# Patient Record
Sex: FEMALE | URBAN - METROPOLITAN AREA
[De-identification: names, ages, dates, MRNs, and addresses within clinical notes are randomized per-mention and may not be internally consistent; named-entity substitution may affect disease eponyms.]

---

## 2017-08-08 PROBLEM — D47.2 MONOCLONAL GAMMOPATHY: Status: ACTIVE | Noted: 2017-08-08

## 2017-08-08 PROBLEM — I69.322 CVA, OLD, DYSARTHRIA: Status: ACTIVE | Noted: 2017-08-08

## 2020-07-09 ENCOUNTER — OFFICE VISIT (OUTPATIENT)
Dept: ENDOCRINOLOGY | Age: 55
End: 2020-07-09
Payer: COMMERCIAL

## 2020-07-09 VITALS
SYSTOLIC BLOOD PRESSURE: 132 MMHG | BODY MASS INDEX: 36.7 KG/M2 | TEMPERATURE: 95.6 F | WEIGHT: 199.4 LBS | DIASTOLIC BLOOD PRESSURE: 74 MMHG | HEIGHT: 62 IN | OXYGEN SATURATION: 95 % | HEART RATE: 105 BPM

## 2020-07-09 PROBLEM — E78.2 MIXED HYPERLIPIDEMIA: Status: ACTIVE | Noted: 2020-07-09

## 2020-07-09 PROBLEM — Z86.73 HISTORY OF CVA (CEREBROVASCULAR ACCIDENT): Status: ACTIVE | Noted: 2020-07-09

## 2020-07-09 PROBLEM — E66.9 CLASS 2 OBESITY WITH BODY MASS INDEX (BMI) OF 35.0 TO 35.9 IN ADULT: Status: ACTIVE | Noted: 2020-07-09

## 2020-07-09 PROCEDURE — 2022F DILAT RTA XM EVC RTNOPTHY: CPT | Performed by: INTERNAL MEDICINE

## 2020-07-09 PROCEDURE — 4004F PT TOBACCO SCREEN RCVD TLK: CPT | Performed by: INTERNAL MEDICINE

## 2020-07-09 PROCEDURE — 3046F HEMOGLOBIN A1C LEVEL >9.0%: CPT | Performed by: INTERNAL MEDICINE

## 2020-07-09 PROCEDURE — G8417 CALC BMI ABV UP PARAM F/U: HCPCS | Performed by: INTERNAL MEDICINE

## 2020-07-09 PROCEDURE — G8427 DOCREV CUR MEDS BY ELIG CLIN: HCPCS | Performed by: INTERNAL MEDICINE

## 2020-07-09 PROCEDURE — 99204 OFFICE O/P NEW MOD 45 MIN: CPT | Performed by: INTERNAL MEDICINE

## 2020-07-09 PROCEDURE — 3017F COLORECTAL CA SCREEN DOC REV: CPT | Performed by: INTERNAL MEDICINE

## 2020-07-09 RX ORDER — DEXTROMETHORPHAN HYDROBROMIDE AND QUINIDINE SULFATE 20; 10 MG/1; MG/1
1 CAPSULE, GELATIN COATED ORAL DAILY
COMMUNITY

## 2020-07-09 RX ORDER — METOPROLOL SUCCINATE 25 MG/1
25 TABLET, EXTENDED RELEASE ORAL DAILY
COMMUNITY

## 2020-07-09 RX ORDER — CITALOPRAM 10 MG/1
10 TABLET ORAL DAILY
COMMUNITY

## 2020-07-09 RX ORDER — DIVALPROEX SODIUM 500 MG/1
500 TABLET, EXTENDED RELEASE ORAL DAILY
COMMUNITY

## 2020-07-09 RX ORDER — ASPIRIN 81 MG/1
81 TABLET ORAL DAILY
COMMUNITY

## 2020-07-09 RX ORDER — INSULIN LISPRO 200 [IU]/ML
INJECTION, SOLUTION SUBCUTANEOUS
Qty: 2 PEN | Refills: 3
Start: 2020-07-09

## 2020-07-09 RX ORDER — PRAMIPEXOLE DIHYDROCHLORIDE 0.5 MG/1
0.5 TABLET ORAL 3 TIMES DAILY
COMMUNITY

## 2020-07-09 RX ORDER — GABAPENTIN 100 MG/1
100 CAPSULE ORAL 3 TIMES DAILY
COMMUNITY

## 2020-07-09 RX ORDER — BUSPIRONE HYDROCHLORIDE 5 MG/1
5 TABLET ORAL 3 TIMES DAILY
COMMUNITY

## 2020-07-09 RX ORDER — BLOOD SUGAR DIAGNOSTIC
1 STRIP MISCELLANEOUS DAILY
COMMUNITY

## 2020-07-09 NOTE — PROGRESS NOTES
Angela Griffith is a 62 y.o. female who is being evaluated for Type 2 diabetes mellitus. Current symptoms/problems include hyperglycemia and are worsening. DM type 2, uncontrolled, with neuropathy ([facility] [address] [E11.40, E11.65]    Diagnosed with Type 2 diabetes mellitus in 2010. Comorbid conditions: hyperlipidemia, CVA and Neuropathy    Current diabetic medications include: Levemir 60 units qhs, Humalog 4-10 units qac    Intolerance to diabetes medications: Yes Metformin-vomiting, another medication, does not remember the name- low BG  2017 had CVA, left side affected. Weight trend: stable  Prior visit with dietician: yes  Current diet: on average, 1-2 meals per day  Current exercise: no     Current monitoring regimen: home blood tests - 2-4 times daily  Has brought blood glucose log/meter:  No  Home blood sugar records: fasting range: 150-200 and postprandial range: 140-220   Any episodes of hypoglycemia? Yes  Hypoglycemia frequency and time(s):  rare  Does patient have Glucagon emergency kit? No  Does patient have rapid acting carbohydrate? Yes  Does patient wear a medic alert bracelet or necklace? No    2. Mixed hyperlipidemia  Has muscle  cramping     3. Class 2 severe obesity with serious comorbidity and body mass index (BMI) of 36.0 to 36.9 in adult, unspecified obesity type [facility])  Not always eats healthy    4.  History of CVA (cerebrovascular accident)  Left-sided hemiparesis, difficult to walk    Past Medical History:   Diagnosis Date    Acute DVT (deep venous thrombosis) (HCC)     Anxiety     Cerebral artery occlusion with cerebral infarction (HCC)     Chronic back pain     Depression     Depression with anxiety     Diabetes mellitus ([facility] [address]     Edema     Hyperlipidemia     IBS (irritable bowel syndrome)     PE (pulmonary thromboembolism) ([address]       Patient Active Problem List   Diagnosis    CVA, old, dysarthria    Monoclonal gammopathy    DM type 2, uncontrolled, with neuropathy ([address]  Mixed hyperlipidemia    Class 2 obesity with body mass index (BMI) of 35.0 to 35.9 in adult    History of CVA (cerebrovascular accident)     Past Surgical History:   Procedure Laterality Date    CARDIAC CATHETERIZATION       SECTION      CHOLECYSTECTOMY      CHOLECYSTECTOMY      IVC FILTER INSERTION      TONSILLECTOMY       Social History     Socioeconomic History    Marital status: Unknown     Spouse name: Not on file    Number of children: Not on file    Years of education: Not on file    Highest education level: Not on file   Occupational History    Not on file   Social Needs    Financial resource strain: Not on file    Food insecurity     Worry: Not on file     Inability: Not on file    Transportation needs     Medical: Not on file     Non-medical: Not on file   Tobacco Use    Smoking status: Current Every Day Smoker     Packs/day: 1.00     Years: 20.00     Pack years: 20.00     Types: Cigarettes    Smokeless tobacco: Never Used   Substance and Sexual Activity    Alcohol use: Not Currently    Drug use: Not on file    Sexual activity: Not on file   Lifestyle    Physical activity     Days per week: Not on file     Minutes per session: Not on file    Stress: Not on file   Relationships    Social connections     Talks on phone: Not on file     Gets together: Not on file     Attends Anabaptist service: Not on file     Active member of club or organization: Not on file     Attends meetings of clubs or organizations: Not on file     Relationship status: Not on file    Intimate partner violence     Fear of current or ex partner: Not on file     Emotionally abused: Not on file     Physically abused: Not on file     Forced sexual activity: Not on file   Other Topics Concern    Not on file   Social History Narrative    Not on file     Family History   Problem Relation Age of Onset    Hypertension Mother     Diabetes Mother     Heart Disease Father      Current Outpatient Medications Medication Sig Dispense Refill    insulin lispro (HUMALOG KWIKPEN) 200 UNIT/ML SOPN pen 150-200- 2 units, 201-250 4 units, 251-300 6 units 301-350 8 units, 351-400 10 units 2 pen 3    insulin detemir (LEVEMIR FLEXPEN) 100 UNIT/ML injection pen Inject 60 Units into the skin nightly 5 pen 1    pramipexole (MIRAPEX) 0.5 MG tablet Take 0.5 mg by mouth 3 times daily      metoprolol succinate (TOPROL XL) 25 MG extended release tablet Take 25 mg by mouth daily      divalproex (DEPAKOTE ER) 500 MG extended release tablet Take 500 mg by mouth daily      citalopram (CELEXA) 10 MG tablet Take 10 mg by mouth daily      busPIRone (BUSPAR) 5 MG tablet Take 5 mg by mouth 3 times daily      blood glucose test strips (ONETOUCH ULTRA) strip 1 each by In Vitro route daily As needed.  dextromethorphan-quiNIDine (NUEDEXTA) 20-10 MG CAPS per capsule Take 1 capsule by mouth daily      Magic Mouthwash (MIRACLE MOUTHWASH) Swish and spit 5 mLs 4 times daily as needed for Irritation      gabapentin (NEURONTIN) 100 MG capsule Take 100 mg by mouth 3 times daily.  aspirin 81 MG EC tablet Take 81 mg by mouth daily      atorvastatin (LIPITOR) 80 MG tablet Take 80 mg by mouth daily      rivaroxaban (XARELTO) 20 MG TABS tablet Take 20 mg by mouth      buPROPion (WELLBUTRIN XL) 300 MG extended release tablet Take 300 mg by mouth every morning      rOPINIRole (REQUIP) 0.5 MG tablet Take 0.5 mg by mouth 3 times daily       No current facility-administered medications for this visit.       Allergies   Allergen Reactions    Coumadin [Warfarin Sodium] Hives    Red Dye Rash     Family Status   Relation Name Status    Mother  (Not Specified)    Father  (Not Specified)       Lab Review:    No results found for: WBC, HGB, HCT, MCV, PLT  Lab Results   Component Value Date    PROT 6.8 08/08/2017    PROT 6.8 08/08/2017    LABALBU 4.26 08/08/2017     No results found for: TSHFT4, TSH, FT3  No results found for: LABA1C  No results found for: EAG  No results found for: CHOL  No results found for: TRIG  No results found for: HDL  No results found for: LDLCHOLESTEROL, LDLCALC  No results found for: LABVLDL, VLDL  No results found for: CHOLHDLRATIO  No results found for: Julienne Benes  No results found for: VITD25     Review of Systems:  Constitutional: has fatigue, no fever, no recent weight gain, no recent weight loss, has changes in appetite  Eyes: no eye pain, has change in vision, no eye redness, no eye irritation, no double vision  Ears, nose, throat: no nasal congestion, no sore throat, no earache, no decrease in hearing, no hoarseness, no dry mouth, no sinus problems, no difficulty swallowing, no neck lumps, no dental problems, no mouth sores, no ringing in ears  Pulmonary: no shortness of breath, no wheezing, no dyspnea on exertion, no cough  Cardiovascular: no chest pain, no lower extremity edema, no orthopnea, no intermittent leg claudication, no palpitations  Gastrointestinal: no abdominal pain, no nausea, no vomiting, no diarrhea, no constipation, no dysphagia, no heartburn, no bloating  Genitourinary: no dysuria, no urinary incontinence, no urinary hesitancy, no urinary frequency, no feelings of urinary urgency, has nocturia  Musculoskeletal: no joint swelling, no joint stiffness, has joint pain, has muscle cramps, no muscle pain, no bone pain  Integument/Breast: no hair loss, no skin rashes, no skin lesions, no itching, has dry skin  Neurological: has numbness, has tingling, no weakness, no confusion, no headaches, has dizziness, no fainting, no tremors, no decrease in memory, has balance problems  Psychiatric: has anxiety, has depression, has insomnia  Hematologic/Lymphatic: no tendency for easy bleeding, no swollen lymph nodes, no tendency for easy bruising  Immunology: no seasonal allergies, no frequent infections, no frequent illnesses  Endocrine: no temperature intolerance    /74 (Site: Left Upper Arm, Position: Sitting, Cuff Size: Large Adult)   Pulse 105   Temp 95.6 °F (35.3 °C) (Infrared)   Ht 5' 2\" (1.575 m)   Wt 199 lb 6.4 oz (90.4 kg)   SpO2 95%   BMI 36.47 kg/m²    Wt Readings from Last 3 Encounters:   07/09/20 199 lb 6.4 oz (90.4 kg)   08/08/17 199 lb 6.4 oz (90.4 kg)     Body mass index is 36.47 kg/m².       OBJECTIVE:  Constitutional: no acute distress, well appearing and well nourished  Psychiatric: oriented to person, place and time, judgement and insight and normal, recent and remote memory and intact and mood and affect are normal  Skin: skin and subcutaneous tissue is normal without mass, normal turgor  Head and Face: examination of head and face revealed no abnormalities  Eyes: no lid or conjunctival swelling, erythema or discharge, pupils are normal, equal, round, reactive to light  Ears/Nose: external inspection of ears and nose revealed no abnormalities, hearing is grossly normal  Oropharynx/Mouth/Face: lips, tongue and gums are normal with no lesions, the voice quality was normal  Neck: neck is supple and symmetric, with midline trachea and no masses, thyroid is normal  Lymphatics: normal cervical lymph nodes, normal supraclavicular nodes  Pulmonary: no increased work of breathing or signs of respiratory distress, lungs are clear to auscultation  Cardiovascular: normal heart rate and rhythm, normal S1 and S2, no murmurs and pedal pulses and 2+ bilaterally, No edema  Abdomen: abdomen is soft, non-tender with no masses  Musculoskeletal: normal gait and station and exam of the digits and nails are normal  Neurological: normal coordination and normal general cortical function    Visual inspection:  Deformity/amputation: absent  Skin lesions/pre-ulcerative calluses: absent  Edema: right- negative, left- negative    Sensory exam:  Monofilament sensation: normal  (minimum of 5 random plantar locations tested, avoiding callused areas - > 1 area with absence of sensation is + for neuropathy)    Plus at least one of the following:  Pulses: normal  Proprioception: Intact  Vibration (128 Hz): Impaired    ASSESSMENT/PLAN:  1. DM type 2, uncontrolled, with neuropathy (HCC)  Check blood glucose 4 times daily  Consider diabetes educator/dietitian consult  Obtain lab work  Bring blood glucose record  - insulin lispro (HUMALOG KWIKPEN) 200 UNIT/ML SOPN pen; 150-200- 2 units, 201-250 4 units, 251-300 6 units 301-350 8 units, 351-400 10 units  Dispense: 2 pen; Refill: 3  - insulin detemir (LEVEMIR FLEXPEN) 100 UNIT/ML injection pen; Inject 60 Units into the skin nightly  Dispense: 5 pen; Refill: 1  - HM DIABETES FOOT EXAM  - Hemoglobin A1C; Future  - Comprehensive Metabolic Panel; Future  - Lipid Panel; Future  - Microalbumin / Creatinine Urine Ratio; Future    2. Mixed hyperlipidemia  Continue atorvastatin 80 mg daily  - Lipid Panel; Future    3. Class 2 severe obesity with serious comorbidity and body mass index (BMI) of 36.0 to 36.9 in adult, unspecified obesity type (HCC)  Diet, activity as tolerated    4.  History of CVA (cerebrovascular accident)  Left-sided hemiparesis        Reviewed and/or ordered clinical lab results Yes  Reviewed and/or ordered radiology tests Yes  Reviewed and/or ordered other diagnostic tests No  Discussed test results with performing physician No  Independently reviewed image, tracing, or specimen No  Made a decision to obtain old records No  Reviewed and summarized old records Yes   Calcium 7.7  HbA1C 10.7  LDL 87  Creatinine 0.53  Obtained history from other than patient No    Gabriella OLIVIA Head was counseled regarding symptoms of diabetes, hyperlipidemia diagnosis, course and complications of disease if inadequately treated, side effects of medications, diagnosis, treatment options, and prognosis, risks, benefits, complications, and alternatives of treatment, labs, imaging and other studies and treatment targets and goals, importance of monitoring blood glucose, CGM, hyperlipidemia, basal and prandial insulin requirements. She understands instructions and counseling. These diagnosis were discussed and reviewed with the patient including the advantages of drug therapy. She was counseled at this visit on the following: diabetes complication prevention and foot care. Total visit time 45 minutes, more than 50% was counseling time  See assessment, plan and counseling note for details    Return in about 1 month (around 8/9/2020) for diabetes.     Electronically signed by Soco Pate MD on 7/9/2020 at 11:59 PM